# Patient Record
Sex: FEMALE | Race: WHITE | URBAN - NONMETROPOLITAN AREA
[De-identification: names, ages, dates, MRNs, and addresses within clinical notes are randomized per-mention and may not be internally consistent; named-entity substitution may affect disease eponyms.]

---

## 2021-01-08 ENCOUNTER — OFFICE VISIT (OUTPATIENT)
Dept: FAMILY MEDICINE CLINIC | Age: 21
End: 2021-01-08
Payer: COMMERCIAL

## 2021-01-08 VITALS
TEMPERATURE: 98 F | SYSTOLIC BLOOD PRESSURE: 110 MMHG | OXYGEN SATURATION: 99 % | DIASTOLIC BLOOD PRESSURE: 73 MMHG | HEART RATE: 103 BPM

## 2021-01-08 DIAGNOSIS — Z20.822 SUSPECTED COVID-19 VIRUS INFECTION: ICD-10-CM

## 2021-01-08 DIAGNOSIS — Z20.822 EXPOSURE TO COVID-19 VIRUS: Primary | ICD-10-CM

## 2021-01-08 DIAGNOSIS — Z20.822 EXPOSURE TO COVID-19 VIRUS: ICD-10-CM

## 2021-01-08 PROCEDURE — 99213 OFFICE O/P EST LOW 20 MIN: CPT | Performed by: NURSE PRACTITIONER

## 2021-01-08 NOTE — PROGRESS NOTES
HPI  Kellie Barrett is a 24 y.o. female and presents today for Concern For COVID-19 (Coronavirus)  Patient states that she has been around her boyfriend, last exposure was Monday/Tuesday of this week; states that he went and got tested for Covid and was positive; he had a positive contact and became sick; patient states that she started with sore throat, body aches and more recently shortness of breath. Her symptoms showed up around Monday/Tuesday. She denies any fevers. She denies any major medical history. was around boyfriend on Mon/Tue of this week and he was sick, got tested and was positive; patient started with   St, body aches; no fevers    Recent Travel Screening and Travel History documentation   Travel Screening      No screening recorded since 01/07/21 0000      Travel History   Travel since 12/08/20     No documented travel since 12/08/20          Screening  On the basis of positive PHQ-9 screening ( ), C-SSRS completed. Patient will follow-up as needed/as directed with PCP. Allergies  No Known Allergies     Medications  No current outpatient medications on file. No current facility-administered medications for this visit. Problem List  There are no active problems to display for this patient. Vitals  Visit Vitals  /73   Pulse (!) 103   Temp 98 °F (36.7 °C)   SpO2 99%        ROS  Review of Systems   Constitutional: Negative for chills, fever and malaise/fatigue. HENT: Positive for sore throat. Negative for congestion, ear pain and sinus pain. Eyes: Negative for blurred vision and redness. Respiratory: Positive for shortness of breath. Negative for cough, sputum production and wheezing. Cardiovascular: Negative for chest pain, palpitations and leg swelling. Gastrointestinal: Negative for abdominal pain, constipation, diarrhea, heartburn, nausea and vomiting. Genitourinary: Negative for dysuria and hematuria. Musculoskeletal: Positive for myalgias. Negative for falls and joint pain. Skin: Negative for rash. Neurological: Negative for dizziness, seizures, weakness and headaches. Endo/Heme/Allergies: Does not bruise/bleed easily. Psychiatric/Behavioral: Negative for depression and suicidal ideas. The patient does not have insomnia. Physical Exam  Physical Exam  Vitals signs and nursing note reviewed. Constitutional:       General: She is awake. She is not in acute distress. Appearance: Normal appearance. She is not ill-appearing or toxic-appearing. HENT:      Head: Normocephalic and atraumatic. Right Ear: Tympanic membrane, ear canal and external ear normal. No middle ear effusion. There is no impacted cerumen. Tympanic membrane is not erythematous or bulging. Left Ear: Tympanic membrane, ear canal and external ear normal.  No middle ear effusion. There is no impacted cerumen. Tympanic membrane is not erythematous or bulging. Nose: Nose normal. No congestion or rhinorrhea. Right Turbinates: Not swollen. Left Turbinates: Not swollen. Right Sinus: No maxillary sinus tenderness or frontal sinus tenderness. Left Sinus: No maxillary sinus tenderness or frontal sinus tenderness. Mouth/Throat:      Mouth: Mucous membranes are moist.      Pharynx: Oropharynx is clear. No oropharyngeal exudate or posterior oropharyngeal erythema. Tonsils: No tonsillar exudate. Eyes:      General:         Right eye: No discharge. Left eye: No discharge. Extraocular Movements: Extraocular movements intact. Neck:      Musculoskeletal: Normal range of motion. Cardiovascular:      Rate and Rhythm: Normal rate and regular rhythm. Pulmonary:      Effort: Pulmonary effort is normal. No respiratory distress. Breath sounds: Normal breath sounds. No wheezing or rhonchi. Musculoskeletal: Normal range of motion. Lymphadenopathy:      Cervical: No cervical adenopathy. Skin:     Findings: No rash. Neurological:      General: No focal deficit present. Mental Status: She is alert and oriented to person, place, and time. Psychiatric:         Behavior: Behavior normal.          Assessment/Plan  1. Exposure to COVID-19 virus  Because of her exposure, I recommend 14 day quarantine; Instructed patient to self quarantine; answered all questions regarding this/covid. If Covid testing was performed today, Will call with test results as soon as they are available; recommend otc tylenol prn for fever/pain; also recommend vit D/vit C/zinc/b12 daily and melatonin for sleep; discussed deep breathing exercises to be done at home; advised for worsening symptoms, sob, difficulty breathing or any other concerning symptoms, instructed patient to go to nearest ER   - NOVEL CORONAVIRUS (COVID-19); Future    2. Suspected COVID-19 virus infection  Because of her exposure, I recommend 14 day quarantine; Instructed patient to self quarantine; answered all questions regarding this/covid. If Covid testing was performed today, Will call with test results as soon as they are available; recommend otc tylenol prn for fever/pain; also recommend vit D/vit C/zinc/b12 daily and melatonin for sleep; discussed deep breathing exercises to be done at home; advised for worsening symptoms, sob, difficulty breathing or any other concerning symptoms, instructed patient to go to nearest ER   - NOVEL CORONAVIRUS (COVID-19); Future       Reviewed with him/her that COVID-19 pandemic is an evolving situation with rapidly changing recommendations & guidelines. Medical decisions are made based on the the best information available at the time. Recommended he/she stay tuned for updates published by trusted sources and to advise your PCP of any unexpected changes in clinical condition    Socrates Pike, NICKO, FNP-BC       This visit was provided as a focused evaluation during the COVID -19 pandemic/national emergency.   A comprehensive review of all previous patient history and testing was not conducted. Pertinent findings were elicited during the visit.

## 2021-01-10 LAB — SARS-COV-2, NAA: NOT DETECTED
